# Patient Record
Sex: MALE | Race: WHITE | NOT HISPANIC OR LATINO | Employment: OTHER | URBAN - METROPOLITAN AREA
[De-identification: names, ages, dates, MRNs, and addresses within clinical notes are randomized per-mention and may not be internally consistent; named-entity substitution may affect disease eponyms.]

---

## 2021-04-08 DIAGNOSIS — Z23 ENCOUNTER FOR IMMUNIZATION: ICD-10-CM

## 2024-02-23 ENCOUNTER — EVALUATION (OUTPATIENT)
Dept: PHYSICAL THERAPY | Facility: REHABILITATION | Age: 74
End: 2024-02-23
Payer: COMMERCIAL

## 2024-02-23 DIAGNOSIS — M54.42 ACUTE BACK PAIN WITH SCIATICA, LEFT: Primary | ICD-10-CM

## 2024-02-23 PROCEDURE — 97110 THERAPEUTIC EXERCISES: CPT | Performed by: PHYSICAL THERAPIST

## 2024-02-23 PROCEDURE — 97161 PT EVAL LOW COMPLEX 20 MIN: CPT | Performed by: PHYSICAL THERAPIST

## 2024-02-23 NOTE — PROGRESS NOTES
PT Evaluation     Today's date: 2024  Patient name: Terrell Grimm  : 1950  MRN: [id number]  Referring provider: Belen Anton  Dx:   Encounter Diagnosis     ICD-10-CM    1. Acute back pain with sciatica, left  M54.42                      Assessment  Assessment details: Terrell Grimm is a 73 y.o. male referred to outpt PT with dx of lumbar radiculopathy.  Pt presents with decrease in trunk ROM with peripheralization of concordant pain with lumbar extension and left SB, centralization with opposite directions of flexion and right SB, diminished lumbar joint play, and restriction of neural tension testing left LE.  Pt funct is limited with decrease in tolerance to ambulation, sitting prolonged, and positive sleep disturbances.  Pt would benefit from skilled PT to address these limitations and max funct.     Impairments: abnormal or restricted ROM, impaired balance, impaired physical strength and pain with function     Prognosis: good    Goals  Funct 1. Improve tolerance to ambulation up to 500 feet prior to peripheralization of pain x4 weeks.  2. Increase tolerance to sleeping in bed for 4-5 hours x4 weeks.   Impairment 1. Increase ROM by 25% x4 weeks  2. Decrease pain by 25 % x4 weeks      Plan  Planned therapy interventions: home exercise program, therapeutic exercise, stretching, strengthening, postural training, nerve gliding, neuromuscular re-education, manual therapy and joint mobilization  Frequency: 2x week  Duration in weeks: 4        Subjective Evaluation    History of Present Illness  Mechanism of injury: Pt reports that he has left sided glute and posterior leg leg pain over the last 2 weeks.  Pt was seen by PCP and was issued prescription meds without changes in pain.  Pt was using tylenol to assist with his sx's, but does note improvement in sx's slightly.  Pt has been struggling with sleeping in which he is unable to lay and currently sleeping in recliner with left leg elevated over arm  rest.  Pt does note an increase in pain with walking and feels sx's progress with increase in duration.  Pt has been unable to walk through [employer] as he was able to prior due to pain and currently using cart.  Pt reports pain posterior left thigh from glute to popliteal space.  Negative numbness associated with pain and no further distal than knee.      Denies trauma, negative CA hx, negative change in bowel or bladder, negative N/T, and negative saddle parasthesias.  Pt denies prior lumbar hx including no surgery.  Pt did have gastric bypass approx 20 years prior.   Patient Goals  Patient goals for therapy: decreased pain and independence with ADLs/IADLs    Pain  Current pain ratin  At best pain ratin  At worst pain rating: 10          Objective     Active Range of Motion     Additional Active Range of Motion Details  Pt has negative TTP present throughout lumbar spine.  Pt has increase in S1 radicular sx's with lumbar extension and left SB, centralization of sx's with lumbar flexion and right SB.      Joint Play   Joints within functional limits: T8, T9, T10, T11, T12, L1, L2 and L3     Hypomobile: L4, L5 and S1     Pain: L5 and S1     Strength/Myotome Testing     Left Hip   Planes of Motion   Flexion: 5    Right Hip   Planes of Motion   Flexion: 5    Left Knee   Flexion: 5  Extension: 5    Right Knee   Flexion: 5  Extension: 5    Left Ankle/Foot   Dorsiflexion: 5  Plantar flexion: 5  Great toe extension: 5    Right Ankle/Foot   Dorsiflexion: 5  Plantar flexion: 5  Great toe extension: 5    Tests     Lumbar     Left   Positive passive SLR and slump test.   Negative crossed SLR.     Right   Negative crossed SLR, passive SLR and slump test.     Left Hip   Negative NICOLE and FADIR.     Right Hip   Negative NICOLE and FADIR.            Precautions: hx CVA, gastric bypass, cholocystectomy.        Manual  24        LAD L LE             R SL lumbar distraction mob                                                              Neuro Re-Ed                                                                                                  Therex            Lumbar flexion seated  hep          lumbar flexion standing  hep            lumbar SB standing  hep            lateral shift at wall  hep                                                                                  Gait Training                                 Modalities

## 2024-02-23 NOTE — LETTER
2024    Belen Ervignesh  [address]  [place] [address]    Patient: Terrell Grimm   YOB: 1950   Date of Visit: 2024     Encounter Diagnosis     ICD-10-CM    1. Acute back pain with sciatica, left  M54.42           Dear Dr. Anton:    Thank you for your recent referral of Terrell Grimm. Please review the attached evaluation summary from Terrell's recent visit.     Please verify that you agree with the plan of care by signing the attached order.     If you have any questions or concerns, please do not hesitate to call.     I sincerely appreciate the opportunity to share in the care of one of your patients and hope to have another opportunity to work with you in the near future.       Sincerely,    Cassie Grimm, PT      Referring Provider:      I certify that I have read the below Plan of Care and certify the need for these services furnished under this plan of treatment while under my care.                    Belen Anton  [address]  [place] [address]  Via Fax: [phone]          PT Evaluation     Today's date: 2024  Patient name: Terrell Grimm  : 1950  MRN: [id number]  Referring provider: Belen Anton  Dx:   Encounter Diagnosis     ICD-10-CM    1. Acute back pain with sciatica, left  M54.42                      Assessment  Assessment details: Terrell Grimm is a 73 y.o. male referred to outpt PT with dx of lumbar radiculopathy.  Pt presents with decrease in trunk ROM with peripheralization of concordant pain with lumbar extension and left SB, centralization with opposite directions of flexion and right SB, diminished lumbar joint play, and restriction of neural tension testing left LE.  Pt funct is limited with decrease in tolerance to ambulation, sitting prolonged, and positive sleep disturbances.  Pt would benefit from skilled PT to address these limitations and max funct.     Impairments: abnormal or restricted ROM, impaired balance, impaired  physical strength and pain with function     Prognosis: good    Goals  Funct 1. Improve tolerance to ambulation up to 500 feet prior to peripheralization of pain x4 weeks.  2. Increase tolerance to sleeping in bed for 4-5 hours x4 weeks.   Impairment 1. Increase ROM by 25% x4 weeks  2. Decrease pain by 25 % x4 weeks      Plan  Planned therapy interventions: home exercise program, therapeutic exercise, stretching, strengthening, postural training, nerve gliding, neuromuscular re-education, manual therapy and joint mobilization  Frequency: 2x week  Duration in weeks: 4        Subjective Evaluation    History of Present Illness  Mechanism of injury: Pt reports that he has left sided glute and posterior leg leg pain over the last 2 weeks.  Pt was seen by PCP and was issued prescription meds without changes in pain.  Pt was using tylenol to assist with his sx's, but does note improvement in sx's slightly.  Pt has been struggling with sleeping in which he is unable to lay and currently sleeping in recliner with left leg elevated over arm rest.  Pt does note an increase in pain with walking and feels sx's progress with increase in duration.  Pt has been unable to walk through [employer] as he was able to prior due to pain and currently using cart.  Pt reports pain posterior left thigh from glute to popliteal space.  Negative numbness associated with pain and no further distal than knee.      Denies trauma, negative CA hx, negative change in bowel or bladder, negative N/T, and negative saddle parasthesias.  Pt denies prior lumbar hx including no surgery.  Pt did have gastric bypass approx 20 years prior.   Patient Goals  Patient goals for therapy: decreased pain and independence with ADLs/IADLs    Pain  Current pain ratin  At best pain ratin  At worst pain rating: 10          Objective     Active Range of Motion     Additional Active Range of Motion Details  Pt has negative TTP present throughout lumbar spine.  Pt has  increase in S1 radicular sx's with lumbar extension and left SB, centralization of sx's with lumbar flexion and right SB.      Joint Play   Joints within functional limits: T8, T9, T10, T11, T12, L1, L2 and L3     Hypomobile: L4, L5 and S1     Pain: L5 and S1     Strength/Myotome Testing     Left Hip   Planes of Motion   Flexion: 5    Right Hip   Planes of Motion   Flexion: 5    Left Knee   Flexion: 5  Extension: 5    Right Knee   Flexion: 5  Extension: 5    Left Ankle/Foot   Dorsiflexion: 5  Plantar flexion: 5  Great toe extension: 5    Right Ankle/Foot   Dorsiflexion: 5  Plantar flexion: 5  Great toe extension: 5    Tests     Lumbar     Left   Positive passive SLR and slump test.   Negative crossed SLR.     Right   Negative crossed SLR, passive SLR and slump test.     Left Hip   Negative NICOLE and FADIR.     Right Hip   Negative NICOLE and FADIR.            Precautions: hx CVA, gastric bypass, cholocystectomy.        Manual  2/23/24        LAD L LE             R SL lumbar distraction mob                                                             Neuro Re-Ed                                                                                                  Therex            Lumbar flexion seated  hep          lumbar flexion standing  hep            lumbar SB standing  hep            lateral shift at wall  hep                                                                                  Gait Training                                 Modalities

## 2024-02-26 ENCOUNTER — OFFICE VISIT (OUTPATIENT)
Dept: PHYSICAL THERAPY | Facility: REHABILITATION | Age: 74
End: 2024-02-26
Payer: COMMERCIAL

## 2024-02-26 DIAGNOSIS — M54.42 ACUTE BACK PAIN WITH SCIATICA, LEFT: Primary | ICD-10-CM

## 2024-02-26 PROCEDURE — 97110 THERAPEUTIC EXERCISES: CPT | Performed by: PHYSICAL THERAPIST

## 2024-02-26 PROCEDURE — 97140 MANUAL THERAPY 1/> REGIONS: CPT | Performed by: PHYSICAL THERAPIST

## 2024-02-26 NOTE — PROGRESS NOTES
"Daily Note     Today's date: 2024  Patient name: Terrell Grimm  : 1950  MRN: [id number]  Referring provider: Belen Anton  Dx:   Encounter Diagnosis     ICD-10-CM    1. Acute back pain with sciatica, left  M54.42                      Subjective: pt notes improvement in pain and sx's since IE.  Pt has been able to tolerate sleeping in bed with improved tolerance to sleep without waking from pain.  Pt arrives with pain 1-2/10.       Objective: See treatment diary below     Precautions: hx CVA, gastric bypass, cholocystectomy.        Manual  24          LAD L LE    5 mins         R SL lumbar distraction mob    8 mins                                                          Neuro Re-Ed                                                                                                                             Therex             Lumbar flexion seated  hep  5\"x10           lumbar flexion standing  hep            lumbar SB standing  hep            lateral shift at wall  hep  5\"x10           rec bike    5 mins          piriformis stretch L   20\"x3           bridges  5\"x10                                       Gait Training                                 Modalities                                                              Assessment: Pt does well with progression of exercise with increase in tolerance to flexion based exercises to improve pain and radicular sx's.  Pt has no pain with left formainal opening and improved mobility.  Pt denies pain throughout exercises greater than 1-2/10 and into glute greater than left LE.       Plan: Continue per plan of care.      "

## 2024-03-01 ENCOUNTER — OFFICE VISIT (OUTPATIENT)
Dept: PHYSICAL THERAPY | Facility: REHABILITATION | Age: 74
End: 2024-03-01
Payer: COMMERCIAL

## 2024-03-01 DIAGNOSIS — M54.42 ACUTE BACK PAIN WITH SCIATICA, LEFT: Primary | ICD-10-CM

## 2024-03-01 PROCEDURE — 97110 THERAPEUTIC EXERCISES: CPT | Performed by: PHYSICAL THERAPIST

## 2024-03-01 PROCEDURE — 97112 NEUROMUSCULAR REEDUCATION: CPT | Performed by: PHYSICAL THERAPIST

## 2024-03-01 PROCEDURE — 97140 MANUAL THERAPY 1/> REGIONS: CPT | Performed by: PHYSICAL THERAPIST

## 2024-03-01 NOTE — PROGRESS NOTES
"Daily Note     Today's date: 3/1/2024  Patient name: Terrell Grimm  : 1950  MRN: [id number]  Referring provider: Belen Anton  Dx:   Encounter Diagnosis     ICD-10-CM    1. Acute back pain with sciatica, left  M54.42                      Subjective: Pt does note cont with some left LE pain, but improvement since initial onset.  Pt has positive anterior lower leg sx's achyness that is intermittent.       Objective: See treatment diary below     Precautions: hx CVA, gastric bypass, cholocystectomy.        Manual  2/23/24  2/26/24  3/1/24        LAD L LE    5 mins  5 mins       R SL lumbar distraction mob    8 mins   8 mins prone and SL                                                       Neuro Re-Ed              bridges     5\"x10         clamshells     3\"x10       Hip abd     3\"x10                                                                              Therex             Lumbar flexion seated  hep  5\"x10   pball 5\"x20         lumbar flexion standing  hep            lumbar SB standing  hep            lateral shift at wall  hep  5\"x10   5\"x10         rec bike    5 mins  6 mins         piriformis stretch L   20\"x3                     LTR      5\"x10                       Gait Training                                 Modalities                                                              Assessment: Pt has improved lumbar mobility throughout without reproducible concordant pain.  Pt denies pain with ambulation, however does demo lateral trunk lean predominant to left during amb.  Added glute strengthening ex on left to assist with support and stability of glute and will progress to WB as appropriate.  Pt encouraged to cont to assist sx's with sx modification exercises.       Plan: Continue per plan of care.      " 92

## 2024-03-04 ENCOUNTER — OFFICE VISIT (OUTPATIENT)
Dept: PHYSICAL THERAPY | Facility: REHABILITATION | Age: 74
End: 2024-03-04
Payer: COMMERCIAL

## 2024-03-04 DIAGNOSIS — M54.42 ACUTE BACK PAIN WITH SCIATICA, LEFT: Primary | ICD-10-CM

## 2024-03-04 PROCEDURE — 97110 THERAPEUTIC EXERCISES: CPT | Performed by: PHYSICAL THERAPIST

## 2024-03-04 PROCEDURE — 97112 NEUROMUSCULAR REEDUCATION: CPT | Performed by: PHYSICAL THERAPIST

## 2024-03-04 PROCEDURE — 97140 MANUAL THERAPY 1/> REGIONS: CPT | Performed by: PHYSICAL THERAPIST

## 2024-03-04 NOTE — PROGRESS NOTES
"Daily Note     Today's date: 3/4/2024  Patient name: Terrell Grimm  : 1950  MRN: [id number]  Referring provider: Belen Anton  Dx:   Encounter Diagnosis     ICD-10-CM    1. Acute back pain with sciatica, left  M54.42                      Subjective: Pt reports that he has been doing really well with min sx's.  Pt reports that he had left left pain after sitting prolonged on harder surface chair (4 hours approx) while at a party.         Objective: See treatment diary below     Precautions: hx CVA, gastric bypass, cholocystectomy.        Manual  2/23/24  2/26/24  3/1/24  3/4/24      LAD L LE    5 mins  5 mins       R SL lumbar distraction mob    8 mins   8 mins prone and SL  8 mins prone                                                     Neuro Re-Ed              bridges     5\"x10   5\"2x10       clamshells     3\"x10  3\"x15     Hip abd     3\"x10  3\"x10       hip ext prone       3\"x10       wall slides OH       5\"x10                                                Therex             Lumbar flexion seated  hep  5\"x10   pball 5\"x20   pball 5\"x20       lumbar flexion standing  hep            lumbar SB standing  hep            lateral shift at wall  hep  5\"x10   5\"x10   5\"x10       rec bike    5 mins  6 mins   6 mins      piriformis stretch L   20\"x3                     LTR      5\"x10   5\"x10                     Gait Training                                 Modalities                                                              Assessment: Pt does well with integration in core and glute strength to assist with support during WB and ambulation.  Pt notes fatigue greater than pain and sx's dissipate quickly upon onset with change of positions-primarily initial standing after sitting/resting.  Pt issued updated HEP.       Plan: Continue per plan of care.      "

## 2024-03-07 ENCOUNTER — OFFICE VISIT (OUTPATIENT)
Dept: PHYSICAL THERAPY | Facility: REHABILITATION | Age: 74
End: 2024-03-07
Payer: COMMERCIAL

## 2024-03-07 DIAGNOSIS — M54.42 ACUTE BACK PAIN WITH SCIATICA, LEFT: Primary | ICD-10-CM

## 2024-03-07 PROCEDURE — 97112 NEUROMUSCULAR REEDUCATION: CPT | Performed by: PHYSICAL THERAPIST

## 2024-03-07 PROCEDURE — 97110 THERAPEUTIC EXERCISES: CPT | Performed by: PHYSICAL THERAPIST

## 2024-03-07 PROCEDURE — 97140 MANUAL THERAPY 1/> REGIONS: CPT | Performed by: PHYSICAL THERAPIST

## 2024-03-07 NOTE — PROGRESS NOTES
"Daily Note     Today's date: 3/7/2024  Patient name: Terrell Grimm  : 1950  MRN: [id number]  Referring provider: Belen Anton  Dx:   Encounter Diagnosis     ICD-10-CM    1. Acute back pain with sciatica, left  M54.42                      Subjective: Pt denies pain overall.  Pt has some on and off achyness but able to increase function.  Pt was able to clean up his yard a few days prior without restrictions in movement.       Objective: See treatment diary below     Precautions: hx CVA, gastric bypass, cholocystectomy.        Manual  2/23/24  2/26/24  3/1/24  3/4/24  3/7/24    LAD L LE    5 mins  5 mins       R SL lumbar distraction mob    8 mins   8 mins prone and SL  8 mins prone  8 mins prone                                                   Neuro Re-Ed              bridges     5\"x10   5\"2x10   5\"x20     clamshells     3\"x10  3\"x15  3\"x15    Hip abd     3\"x10  3\"x10   3\"x10     hip ext prone       3\"x10   3\"x15    wall slides OH       5\"x10   5\"x10                                              Therex             Lumbar flexion seated  hep  5\"x10   pball 5\"x20   pball 5\"x20   pball 5\"x20     lumbar flexion standing  hep            lumbar SB standing  hep            lateral shift at wall  hep  5\"x10   5\"x10   5\"x10   hep    rec bike    5 mins  6 mins   6 mins  7 mins     piriformis stretch L   20\"x3                     LTR      5\"x10   5\"x10   5'x20 ea                  Gait Training                                 Modalities                                                              Assessment: Pt has no restriction throughout lumbar spine today and able to progress reps with exercises.  Pt encouraged to cont with symptom relieving exercises as part of HEP as needed, but to cont to progress strengthening ex as well.       Plan: Continue per plan of care. RE next visit.      "

## 2024-03-14 ENCOUNTER — OFFICE VISIT (OUTPATIENT)
Dept: PHYSICAL THERAPY | Facility: REHABILITATION | Age: 74
End: 2024-03-14
Payer: COMMERCIAL

## 2024-03-14 DIAGNOSIS — M54.42 ACUTE BACK PAIN WITH SCIATICA, LEFT: Primary | ICD-10-CM

## 2024-03-14 PROCEDURE — 97140 MANUAL THERAPY 1/> REGIONS: CPT | Performed by: PHYSICAL THERAPIST

## 2024-03-14 NOTE — PROGRESS NOTES
PT Discharge    Today's date: 3/14/2024  Patient name: Terrell Grimm  : 1950  MRN: [id number]  Referring provider: Belen Anton  Dx:   Encounter Diagnosis     ICD-10-CM    1. Acute back pain with sciatica, left  M54.42                      Assessment  Assessment details: Pt has met all funct and impairment goals with PT.  Pt is Dc'd from PT to cont with HEP.     Goals  Funct 1. Improve tolerance to ambulation up to 500 feet prior to peripheralization of pain x4 weeks.-met  2. Increase tolerance to sleeping in bed for 4-5 hours x4 weeks. -met  Impairment 1. Increase ROM by 25% x4 weeks-met  2. Decrease pain by 25 % x4 weeks-met          Subjective Evaluation    History of Present Illness  Mechanism of injury: Pt notes that he has some improvement in pain and sx's overall in regard to LB and left leg sx's.  Pt is sleeping without waking from sx's and able to sleep in bed versus recliner.  Pt is able to sit without limitations due to left pain and has increased his tolerance to walking.  Pt did amb a store over the weekend without use of cart for up to 2 hours and reports soreness in LB and left buttock, but sx's dissipated within the day with exercises.  Pt denies use of medication to assist with his sx's and is consistent with HEP.   Patient Goals  Patient goals for therapy: decreased pain and independence with ADLs/IADLs  Patient goal: -met  Pain  Current pain ratin  At best pain ratin  At worst pain rating: 3          Objective     Active Range of Motion     Additional Active Range of Motion Details  Pt denies reproducible pain and sx's with trunk ROM in all directions.      Joint Play   Joints within functional limits: T8, T9, T10, T11, T12, L1, L2, L3, L4, L5 and S1     Strength/Myotome Testing     Left Hip   Planes of Motion   Flexion: 5    Right Hip   Planes of Motion   Flexion: 5    Left Knee   Flexion: 5  Extension: 5    Right Knee   Flexion: 5  Extension: 5    Left Ankle/Foot   Dorsiflexion:  "5  Plantar flexion: 5  Great toe extension: 5    Right Ankle/Foot   Dorsiflexion: 5  Plantar flexion: 5  Great toe extension: 5    Tests     Lumbar     Left   Negative crossed SLR, passive SLR and slump test.     Right   Negative crossed SLR, passive SLR and slump test.     Left Hip   Negative NICOLE and FADIR.     Right Hip   Negative NICOLE and FADIR.          Precautions: hx CVA, gastric bypass, cholocystectomy.        Manual  3/14/24  2/26/24  3/1/24  3/4/24  3/7/24    LAD L LE    5 mins  5 mins       R SL lumbar distraction mob  8 mins   8 mins   8 mins prone and SL  8 mins prone  8 mins prone    RE 30 mins                                             Neuro Re-Ed              bridges     5\"x10   5\"2x10   5\"x20     clamshells     3\"x10  3\"x15  3\"x15    Hip abd     3\"x10  3\"x10   3\"x10     hip ext prone       3\"x10   3\"x15    wall slides OH       5\"x10   5\"x10                                              Therex             Lumbar flexion seated   5\"x10   pball 5\"x20   pball 5\"x20   pball 5\"x20     lumbar flexion standing             lumbar SB standing             lateral shift at wall   5\"x10   5\"x10   5\"x10   hep    rec bike  7 mins   5 mins  6 mins   6 mins  7 mins     piriformis stretch L   20\"x3                         LTR      5\"x10   5\"x10   5'x20 ea                  Gait Training                                 Modalities                                                             "